# Patient Record
Sex: FEMALE | Race: BLACK OR AFRICAN AMERICAN | NOT HISPANIC OR LATINO | Employment: UNEMPLOYED | URBAN - METROPOLITAN AREA
[De-identification: names, ages, dates, MRNs, and addresses within clinical notes are randomized per-mention and may not be internally consistent; named-entity substitution may affect disease eponyms.]

---

## 2023-12-16 ENCOUNTER — HOSPITAL ENCOUNTER (EMERGENCY)
Facility: HOSPITAL | Age: 48
Discharge: HOME | End: 2023-12-16
Attending: EMERGENCY MEDICINE

## 2023-12-16 ENCOUNTER — APPOINTMENT (OUTPATIENT)
Dept: RADIOLOGY | Facility: HOSPITAL | Age: 48
End: 2023-12-16

## 2023-12-16 VITALS
DIASTOLIC BLOOD PRESSURE: 86 MMHG | HEIGHT: 65 IN | RESPIRATION RATE: 18 BRPM | WEIGHT: 200 LBS | HEART RATE: 64 BPM | OXYGEN SATURATION: 100 % | SYSTOLIC BLOOD PRESSURE: 148 MMHG | TEMPERATURE: 97.1 F | BODY MASS INDEX: 33.32 KG/M2

## 2023-12-16 DIAGNOSIS — K04.7 DENTAL ABSCESS: Primary | ICD-10-CM

## 2023-12-16 LAB
ALBUMIN SERPL BCP-MCNC: 4 G/DL (ref 3.4–5)
ALP SERPL-CCNC: 64 U/L (ref 33–110)
ALT SERPL W P-5'-P-CCNC: 13 U/L (ref 7–45)
ANION GAP SERPL CALC-SCNC: 11 MMOL/L (ref 10–20)
AST SERPL W P-5'-P-CCNC: 13 U/L (ref 9–39)
B-HCG SERPL-ACNC: <2 MIU/ML
BASOPHILS # BLD AUTO: 0.02 X10*3/UL (ref 0–0.1)
BASOPHILS NFR BLD AUTO: 0.3 %
BILIRUB SERPL-MCNC: 0.7 MG/DL (ref 0–1.2)
BUN SERPL-MCNC: 10 MG/DL (ref 6–23)
CALCIUM SERPL-MCNC: 9.2 MG/DL (ref 8.6–10.3)
CHLORIDE SERPL-SCNC: 106 MMOL/L (ref 98–107)
CO2 SERPL-SCNC: 26 MMOL/L (ref 21–32)
CREAT SERPL-MCNC: 0.62 MG/DL (ref 0.5–1.05)
EOSINOPHIL # BLD AUTO: 0.07 X10*3/UL (ref 0–0.7)
EOSINOPHIL NFR BLD AUTO: 1.2 %
ERYTHROCYTE [DISTWIDTH] IN BLOOD BY AUTOMATED COUNT: 15.3 % (ref 11.5–14.5)
GFR SERPL CREATININE-BSD FRML MDRD: >90 ML/MIN/1.73M*2
GLUCOSE SERPL-MCNC: 98 MG/DL (ref 74–99)
HCT VFR BLD AUTO: 41.2 % (ref 36–46)
HGB BLD-MCNC: 13 G/DL (ref 12–16)
IMM GRANULOCYTES # BLD AUTO: 0.06 X10*3/UL (ref 0–0.7)
IMM GRANULOCYTES NFR BLD AUTO: 1 % (ref 0–0.9)
LYMPHOCYTES # BLD AUTO: 1.5 X10*3/UL (ref 1.2–4.8)
LYMPHOCYTES NFR BLD AUTO: 25.1 %
MCH RBC QN AUTO: 26.5 PG (ref 26–34)
MCHC RBC AUTO-ENTMCNC: 31.6 G/DL (ref 32–36)
MCV RBC AUTO: 84 FL (ref 80–100)
MONOCYTES # BLD AUTO: 0.44 X10*3/UL (ref 0.1–1)
MONOCYTES NFR BLD AUTO: 7.4 %
NEUTROPHILS # BLD AUTO: 3.88 X10*3/UL (ref 1.2–7.7)
NEUTROPHILS NFR BLD AUTO: 65 %
NRBC BLD-RTO: 0 /100 WBCS (ref 0–0)
PLATELET # BLD AUTO: 319 X10*3/UL (ref 150–450)
POTASSIUM SERPL-SCNC: 4.1 MMOL/L (ref 3.5–5.3)
PROT SERPL-MCNC: 7.4 G/DL (ref 6.4–8.2)
RBC # BLD AUTO: 4.91 X10*6/UL (ref 4–5.2)
SODIUM SERPL-SCNC: 139 MMOL/L (ref 136–145)
WBC # BLD AUTO: 6 X10*3/UL (ref 4.4–11.3)

## 2023-12-16 PROCEDURE — 70486 CT MAXILLOFACIAL W/O DYE: CPT

## 2023-12-16 PROCEDURE — 99285 EMERGENCY DEPT VISIT HI MDM: CPT | Mod: 25 | Performed by: EMERGENCY MEDICINE

## 2023-12-16 PROCEDURE — 70491 CT SOFT TISSUE NECK W/DYE: CPT | Performed by: RADIOLOGY

## 2023-12-16 PROCEDURE — 2550000001 HC RX 255 CONTRASTS: Performed by: EMERGENCY MEDICINE

## 2023-12-16 PROCEDURE — 85025 COMPLETE CBC W/AUTO DIFF WBC: CPT | Performed by: PHYSICIAN ASSISTANT

## 2023-12-16 PROCEDURE — 2500000001 HC RX 250 WO HCPCS SELF ADMINISTERED DRUGS (ALT 637 FOR MEDICARE OP): Performed by: PHYSICIAN ASSISTANT

## 2023-12-16 PROCEDURE — 70491 CT SOFT TISSUE NECK W/DYE: CPT

## 2023-12-16 PROCEDURE — 36415 COLL VENOUS BLD VENIPUNCTURE: CPT | Performed by: PHYSICIAN ASSISTANT

## 2023-12-16 PROCEDURE — 84702 CHORIONIC GONADOTROPIN TEST: CPT | Performed by: PHYSICIAN ASSISTANT

## 2023-12-16 PROCEDURE — 80053 COMPREHEN METABOLIC PANEL: CPT | Performed by: PHYSICIAN ASSISTANT

## 2023-12-16 PROCEDURE — 96374 THER/PROPH/DIAG INJ IV PUSH: CPT

## 2023-12-16 PROCEDURE — 2500000004 HC RX 250 GENERAL PHARMACY W/ HCPCS (ALT 636 FOR OP/ED): Performed by: PHYSICIAN ASSISTANT

## 2023-12-16 RX ORDER — CLINDAMYCIN HYDROCHLORIDE 150 MG/1
450 CAPSULE ORAL ONCE
Status: COMPLETED | OUTPATIENT
Start: 2023-12-16 | End: 2023-12-16

## 2023-12-16 RX ORDER — KETOROLAC TROMETHAMINE 30 MG/ML
15 INJECTION, SOLUTION INTRAMUSCULAR; INTRAVENOUS ONCE
Status: COMPLETED | OUTPATIENT
Start: 2023-12-16 | End: 2023-12-16

## 2023-12-16 RX ORDER — ACETAMINOPHEN 325 MG/1
TABLET ORAL
Status: DISCONTINUED
Start: 2023-12-16 | End: 2023-12-16 | Stop reason: HOSPADM

## 2023-12-16 RX ORDER — CLINDAMYCIN HYDROCHLORIDE 150 MG/1
CAPSULE ORAL
Qty: 126 CAPSULE | Refills: 0 | Status: SHIPPED | OUTPATIENT
Start: 2023-12-16

## 2023-12-16 RX ADMIN — IOHEXOL 75 ML: 350 INJECTION, SOLUTION INTRAVENOUS at 12:47

## 2023-12-16 RX ADMIN — CLINDAMYCIN HYDROCHLORIDE 450 MG: 150 CAPSULE ORAL at 11:45

## 2023-12-16 RX ADMIN — KETOROLAC TROMETHAMINE 15 MG: 30 INJECTION, SOLUTION INTRAMUSCULAR at 11:46

## 2023-12-16 ASSESSMENT — COLUMBIA-SUICIDE SEVERITY RATING SCALE - C-SSRS
6. HAVE YOU EVER DONE ANYTHING, STARTED TO DO ANYTHING, OR PREPARED TO DO ANYTHING TO END YOUR LIFE?: NO
2. HAVE YOU ACTUALLY HAD ANY THOUGHTS OF KILLING YOURSELF?: NO
1. IN THE PAST MONTH, HAVE YOU WISHED YOU WERE DEAD OR WISHED YOU COULD GO TO SLEEP AND NOT WAKE UP?: NO

## 2023-12-16 ASSESSMENT — PAIN SCALES - GENERAL
PAINLEVEL_OUTOF10: 9
PAINLEVEL_OUTOF10: 8

## 2023-12-16 ASSESSMENT — PAIN DESCRIPTION - PAIN TYPE: TYPE: ACUTE PAIN

## 2023-12-16 ASSESSMENT — PAIN DESCRIPTION - LOCATION: LOCATION: EAR

## 2023-12-16 ASSESSMENT — PAIN - FUNCTIONAL ASSESSMENT: PAIN_FUNCTIONAL_ASSESSMENT: 0-10

## 2023-12-16 NOTE — Clinical Note
Sofia Doty was seen and treated in our emergency department on 12/16/2023.  She may return to work on 12/17/2023.       If you have any questions or concerns, please don't hesitate to call.      Pricila Muller PA-C

## 2023-12-16 NOTE — ED PROVIDER NOTES
"HPI     CC: Earache (right)     HPI: Sofia Doty is a 48 y.o. female with a history of hypertension and asthma presents to the emergency department with concern for right ear/neck pain.  Patient states that she also has some pain with swallowing.  She first felt the right ear pain a few weeks ago but took Tylenol severe sinus and the symptoms improved.  Symptoms returned yesterday, and she tried Tylenol sinus again without improvement.  When asked if she has had any recent illnesses, she states she did recently have an infected tooth and was placed on antibiotics.  She only finished the antibiotics on Monday and has yet to see a dentist due to lack of insurance.  She denies fever, chills, hearing issues, coughing, or rhinorrhea.    ROS: 10-point review of systems was performed and is otherwise negative except as noted in HPI.      Past Medical History: Noncontributory except per HPI     Past Surgical History: Tubal ligation     Family History: Reviewed and noncontributory     Social History: Denies      Allergies   Allergen Reactions    Amlodipine Besylate Dizziness and Headache    Penicillins Hives and Unknown       Home Meds:   Current Outpatient Medications   Medication Instructions    clindamycin (Cleocin) 150 mg capsule 3 tabs three times per day x14 days.        ED Triage Vitals [12/16/23 1109]   Temp Heart Rate Resp BP   36.2 °C (97.1 °F) 70 18 (!) 142/91      SpO2 Temp Source Heart Rate Source Patient Position   98 % Temporal Monitor Sitting      BP Location FiO2 (%)     Left arm --         Heart Rate:  [60-70]   Temp:  [36.2 °C (97.1 °F)]   Resp:  [18]   BP: (142-148)/(86-91)   Height:  [165.1 cm (5' 5\")]   Weight:  [90.7 kg (200 lb)]   SpO2:  [98 %-100 %]      Physical Exam:  Physical Exam  Constitutional:       General: She is not in acute distress.     Appearance: Normal appearance. She is not ill-appearing.   HENT:      Head: Normocephalic and atraumatic.      Right Ear: Hearing, tympanic membrane " and external ear normal.      Left Ear: Hearing, tympanic membrane and external ear normal.      Nose: Nose normal.      Mouth/Throat:      Mouth: Mucous membranes are moist.        Comments: Multiple missing teeth and poor dentition.  Approximately tooth above is missing/decaying down to the gumline.  Unable to fully characterize which tooth this is based on poor dentition.  Area is not tender to palpation, however there is a lesion on the outer aspect of the gum at this location with central area concerning for abscess.  Facial tenderness extends along the maxillary bone up into the TMJ without mastoid tenderness.  Tenderness is also in the mandibular aspect and down the right anterior neck with some mild swelling.  No oropharynx redness, exudates, or uvular deviation.  Eyes:      Extraocular Movements: Extraocular movements intact.      Conjunctiva/sclera: Conjunctivae normal.      Pupils: Pupils are equal, round, and reactive to light.   Cardiovascular:      Rate and Rhythm: Normal rate and regular rhythm.      Pulses: Normal pulses.   Pulmonary:      Effort: Pulmonary effort is normal. No respiratory distress.      Breath sounds: Normal breath sounds.   Abdominal:      General: Abdomen is flat.      Palpations: Abdomen is soft.      Tenderness: There is no abdominal tenderness.   Musculoskeletal:         General: Normal range of motion.      Cervical back: Normal range of motion and neck supple.   Skin:     General: Skin is warm and dry.      Capillary Refill: Capillary refill takes less than 2 seconds.   Neurological:      General: No focal deficit present.      Mental Status: She is alert and oriented to person, place, and time.   Psychiatric:         Mood and Affect: Mood normal.          Diagnostic Results        Labs Reviewed   CBC WITH AUTO DIFFERENTIAL - Abnormal       Result Value    WBC 6.0      nRBC 0.0      RBC 4.91      Hemoglobin 13.0      Hematocrit 41.2      MCV 84      MCH 26.5      MCHC 31.6  (*)     RDW 15.3 (*)     Platelets 319      Neutrophils % 65.0      Immature Granulocytes %, Automated 1.0 (*)     Lymphocytes % 25.1      Monocytes % 7.4      Eosinophils % 1.2      Basophils % 0.3      Neutrophils Absolute 3.88      Immature Granulocytes Absolute, Automated 0.06      Lymphocytes Absolute 1.50      Monocytes Absolute 0.44      Eosinophils Absolute 0.07      Basophils Absolute 0.02     HUMAN CHORIONIC GONADOTROPIN, SERUM QUANTITATIVE - Normal    HCG, Beta-Quantitative <2      Narrative:      Total HCG measurement is performed using the [employer]   Immunoassay which detects intact HCG and free beta HCG subunit.    This test is not indicated for use as a tumor marker.   HCG testing is performed using a different test methodology at [facility] than other [facility]. Direct result comparison   should only be made within the same method.       COMPREHENSIVE METABOLIC PANEL - Normal    Glucose 98      Sodium 139      Potassium 4.1      Chloride 106      Bicarbonate 26      Anion Gap 11      Urea Nitrogen 10      Creatinine 0.62      eGFR >90      Calcium 9.2      Albumin 4.0      Alkaline Phosphatase 64      Total Protein 7.4      AST 13      Bilirubin, Total 0.7      ALT 13           CT soft tissue neck w IV contrast   Final Result   No rim enhancing drainable collection was identified in the region of   the right anterior neck. If there is a region of concern on physical   examination etiology of finding could be clarified with directed   sonography.        Minimal asymmetry in soft tissue within the visualized portion of the   right compared with left upper breast for instance on image 135   series 603 is of uncertain clinical significance and could be at   least in part due to asymmetric scan positioning. Please correlate   clinically and if further evaluation were clinically necessary   consider correlation with mammography for instance on a nonemergent   basis.         Critical Finding:  See findings. Notification was initiated on   12/16/2023 at 1:48 pm by  Ravinder Nava.  (**-YCF-**) Instructions:        Signed by: Ravinder Nava 12/16/2023 1:48 PM   Dictation workstation:   [id number]      CT maxillofacial bones wo IV contrast   Final Result   1. Severe destruction/dental nino formation in the right 1st   maxillary molar with apical/periapical lucency which may represent   apical abscess formation and/or apical cystic change.   2. If there remains persistent concern for process in the neck a CT   of the neck with intravenous contrast is recommended.        MACRO:   none        Signed by: Ian Escobar 12/16/2023 2:15 PM   Dictation workstation:   [id number]                    [place] Coma Scale Score: 15                  Procedure  Procedures    ED Course & MDM   Assessment/Plan:     Medications   acetaminophen (Tylenol) tablet  - Omnicell Override Pull (  Not Given 12/16/23 1255)   ketorolac (Toradol) injection 15 mg (15 mg intravenous Given 12/16/23 1146)   clindamycin (Cleocin) capsule 450 mg (450 mg oral Given 12/16/23 1145)   iohexol (OMNIPaque) 350 mg iodine/mL solution 75 mL (75 mL intravenous Given 12/16/23 1247)        ED Course as of 12/16/23 1528   Sat Dec 16, 2023   1148 Immature Granulocytes %, Automated(!): 1.0  elevated [EP]      ED Course User Index  [EP] Pricila Muller PA-C         Diagnoses as of 12/16/23 1528   Dental abscess       Medical Decision Making    Sofia Doty is a 48 y.o. female with a history of asthma and hypertension presents to the emergency department with concern for right facial pain and trouble swallowing.  The patient is nontoxic-appearing and her vital signs are normal.  Based on her presentation, physical exam, and history, differential diagnosis includes dental abscess, retropharyngeal abscess, osteomyelitis, or other deep infection.  Will obtain CBC with differential, CMP, CT face with contrast, and CT neck with contrast.  Will  give the patient 15 mg of Toradol and 450 mg of clindamycin orally.  hCG negative.  CMP normal.  CBC unremarkable other than 1.0 immature granulocytes. CT of the facial bones show severe destruction/dental carry formation in the right first maxillary molar with periapical lucency which may represent apical abscess formation.  CT of the neck showed no rim-enhancing drainable fluid collection but if there is concern, recommend ultrasound.  There is an incidental finding of minimal asymmetry in the soft tissue within the visualized portion of the right compared to the left upper breast which is of uncertain clinical significance and could be due to underlying skin positioning.  Based on these findings, a breast exam was performed which showed no acute abnormalities.  Will still recommend that the patient follows up with a formal breast ultrasound ordered by her primary care physician.  A bedside point-of-care ultrasound was performed by the attending.  Will plan to treat the patient with antibiotics and follow-up to dentistry.    Dental pain - I discussed the diagnosis of dental pain with the patient and all of the above findings.  We discussed that there is still a localized abscess in the area of the tooth that is already been treated.  These will require antibiotics as well as follow-up with dentistry as soon as possible.  Will place the patient on clindamycin as she was previously on cefoxitin for antibiotics.  The patient does not currently have any pain, but if some should develop, she may take Motrin or Tylenol for pain control.  I have ordered her follow-up with dentistry so that she may be seen a soon as possible.  Advised to return to the ED should she experience new or worsening symptoms including but not limited to fever, chills, worsening pain, drainage from tooth, or any new symptoms. Patient was agreeable to discharge home.  Incidental findings -primary care physician follow-up was ordered for the  patient so that her incidental finding of possible asymmetric breast changes can be followed up upon.  Would recommend either outpatient ultrasound or mammogram to follow-up.  Patient agreeable to plan of care and felt comfortable returning home.    Disposition: Home    ED Prescriptions       Medication Sig Dispense Start Date End Date Auth. Provider    clindamycin (Cleocin) 150 mg capsule 3 tabs three times per day x14 days. 126 capsule 12/16/2023 -- Pricila Muller PA-C            Social Determinants Affecting Care: lack of insurance      Pricila Muller PA-C    This note was dictated by speech recognition. Minor errors in transcription may be present.     Pricila Muller PA-C  12/16/23 [id number]

## 2024-02-22 ENCOUNTER — HOSPITAL ENCOUNTER (EMERGENCY)
Facility: HOSPITAL | Age: 49
Discharge: HOME | End: 2024-02-22
Payer: COMMERCIAL

## 2024-02-22 VITALS
HEIGHT: 65 IN | TEMPERATURE: 98.1 F | SYSTOLIC BLOOD PRESSURE: 149 MMHG | OXYGEN SATURATION: 99 % | DIASTOLIC BLOOD PRESSURE: 73 MMHG | RESPIRATION RATE: 20 BRPM | BODY MASS INDEX: 33.32 KG/M2 | WEIGHT: 200 LBS | HEART RATE: 70 BPM

## 2024-02-22 DIAGNOSIS — K08.89 PAIN, DENTAL: Primary | ICD-10-CM

## 2024-02-22 PROCEDURE — 2500000001 HC RX 250 WO HCPCS SELF ADMINISTERED DRUGS (ALT 637 FOR MEDICARE OP): Performed by: PHYSICIAN ASSISTANT

## 2024-02-22 PROCEDURE — 99283 EMERGENCY DEPT VISIT LOW MDM: CPT

## 2024-02-22 RX ORDER — CLINDAMYCIN HYDROCHLORIDE 150 MG/1
300 CAPSULE ORAL ONCE
Status: COMPLETED | OUTPATIENT
Start: 2024-02-22 | End: 2024-02-22

## 2024-02-22 RX ORDER — IBUPROFEN 600 MG/1
600 TABLET ORAL ONCE
Status: COMPLETED | OUTPATIENT
Start: 2024-02-22 | End: 2024-02-22

## 2024-02-22 RX ORDER — CLINDAMYCIN HYDROCHLORIDE 150 MG/1
150 CAPSULE ORAL EVERY 6 HOURS
Qty: 28 CAPSULE | Refills: 0 | Status: SHIPPED | OUTPATIENT
Start: 2024-02-22 | End: 2024-02-29

## 2024-02-22 RX ADMIN — IBUPROFEN 600 MG: 600 TABLET, FILM COATED ORAL at 02:00

## 2024-02-22 RX ADMIN — CLINDAMYCIN HYDROCHLORIDE 300 MG: 150 CAPSULE ORAL at 02:00

## 2024-02-22 ASSESSMENT — PAIN - FUNCTIONAL ASSESSMENT: PAIN_FUNCTIONAL_ASSESSMENT: 0-10

## 2024-02-22 ASSESSMENT — COLUMBIA-SUICIDE SEVERITY RATING SCALE - C-SSRS
6. HAVE YOU EVER DONE ANYTHING, STARTED TO DO ANYTHING, OR PREPARED TO DO ANYTHING TO END YOUR LIFE?: NO
1. IN THE PAST MONTH, HAVE YOU WISHED YOU WERE DEAD OR WISHED YOU COULD GO TO SLEEP AND NOT WAKE UP?: NO
2. HAVE YOU ACTUALLY HAD ANY THOUGHTS OF KILLING YOURSELF?: NO

## 2024-02-22 ASSESSMENT — PAIN SCALES - GENERAL: PAINLEVEL_OUTOF10: 5 - MODERATE PAIN

## 2024-02-22 ASSESSMENT — PAIN DESCRIPTION - LOCATION: LOCATION: JAW

## 2024-02-22 NOTE — ED PROVIDER NOTES
HPI   Chief Complaint   Patient presents with    Dental Pain     Per patient complaining of left dental pain left jaw hurts        48-year-old female complains of dental pain and swelling in the side of her jaw she was seen about 6 weeks ago and placed on antibiotics and symptoms resolved but they then returned.  She denies chest pain or shortness of breath.  States she has a dentist she can follow-up with.                          [place] Coma Scale Score: 15                     Patient History   Past Medical History:   Diagnosis Date    Hypertension      No past surgical history on file.  No family history on file.  Social History     Tobacco Use    Smoking status: Never    Smokeless tobacco: Never   Substance Use Topics    Alcohol use: Not on file    Drug use: Not on file       Physical Exam   ED Triage Vitals [02/22/24 0113]   Temperature Heart Rate Respirations BP   36.7 °C (98.1 °F) 70 20 149/73      Pulse Ox Temp Source Heart Rate Source Patient Position   99 % Tympanic -- --      BP Location FiO2 (%)     -- --       Physical Exam  Vitals and nursing note reviewed.   Constitutional:       General: She is not in acute distress.     Appearance: She is well-developed.   HENT:      Head: Normocephalic and atraumatic.      Right Ear: Tympanic membrane, ear canal and external ear normal.      Left Ear: Tympanic membrane, ear canal and external ear normal.      Nose: Nose normal.      Mouth/Throat:      Mouth: Mucous membranes are moist.      Dentition: Dental caries present.      Pharynx: No oropharyngeal exudate or posterior oropharyngeal erythema.   Eyes:      Conjunctiva/sclera: Conjunctivae normal.   Cardiovascular:      Rate and Rhythm: Normal rate and regular rhythm.      Heart sounds: No murmur heard.  Pulmonary:      Effort: Pulmonary effort is normal. No respiratory distress.      Breath sounds: Normal breath sounds.   Abdominal:      Palpations: Abdomen is soft.      Tenderness: There is no abdominal  tenderness.   Musculoskeletal:         General: No swelling.      Cervical back: Neck supple.   Skin:     General: Skin is warm and dry.      Capillary Refill: Capillary refill takes less than 2 seconds.   Neurological:      Mental Status: She is alert.   Psychiatric:         Mood and Affect: Mood normal.         ED Course & MDM   Diagnoses as of 02/22/24 0410   Pain, dental       Medical Decision Making  H&P supports dental infection her ear appears normal on exam she denies chest pain or shortness of breath.  Since clindamycin helped in the past I would recommend she take another course and follow-up with her dentist, ibuprofen for pain, return precautions given.        Procedure  Procedures     Shravan Rico PA-C  02/22/24 [id number]

## 2024-10-05 ENCOUNTER — HOSPITAL ENCOUNTER (EMERGENCY)
Facility: HOSPITAL | Age: 49
Discharge: HOME | End: 2024-10-05
Payer: MEDICARE

## 2024-10-05 VITALS
RESPIRATION RATE: 16 BRPM | WEIGHT: 201 LBS | OXYGEN SATURATION: 95 % | BODY MASS INDEX: 34.31 KG/M2 | HEIGHT: 64 IN | DIASTOLIC BLOOD PRESSURE: 91 MMHG | HEART RATE: 80 BPM | SYSTOLIC BLOOD PRESSURE: 157 MMHG | TEMPERATURE: 98.1 F

## 2024-10-05 DIAGNOSIS — H65.02 ACUTE SEROUS OTITIS MEDIA OF LEFT EAR, RECURRENCE NOT SPECIFIED: Primary | ICD-10-CM

## 2024-10-05 PROCEDURE — 99281 EMR DPT VST MAYX REQ PHY/QHP: CPT

## 2024-10-05 RX ORDER — FLUTICASONE PROPIONATE 50 MCG
1 SPRAY, SUSPENSION (ML) NASAL DAILY
Qty: 16 G | Refills: 0 | Status: SHIPPED | OUTPATIENT
Start: 2024-10-05 | End: 2024-11-04

## 2024-10-05 ASSESSMENT — PAIN DESCRIPTION - ORIENTATION: ORIENTATION: LEFT

## 2024-10-05 ASSESSMENT — PAIN DESCRIPTION - DESCRIPTORS: DESCRIPTORS: ACHING

## 2024-10-05 ASSESSMENT — PAIN SCALES - GENERAL: PAINLEVEL_OUTOF10: 7

## 2024-10-05 ASSESSMENT — PAIN DESCRIPTION - PAIN TYPE: TYPE: ACUTE PAIN

## 2024-10-05 ASSESSMENT — PAIN DESCRIPTION - FREQUENCY: FREQUENCY: CONSTANT/CONTINUOUS

## 2024-10-05 ASSESSMENT — PAIN - FUNCTIONAL ASSESSMENT: PAIN_FUNCTIONAL_ASSESSMENT: 0-10

## 2024-10-05 ASSESSMENT — PAIN DESCRIPTION - LOCATION: LOCATION: EAR

## 2024-10-05 NOTE — ED TRIAGE NOTES
Pt states on Monday she was having right ear/face pain and started on antibiotics. Pt states yesterday the left ear and the left side of the throat was aching. Pt soreness in the throat when swallowing.

## 2024-10-05 NOTE — ED PROVIDER NOTES
HPI   Chief Complaint   Patient presents with    Earache       Is a 49-year-old female who complains of pain and discomfort in her left ear she has been on cefdinir for the past 5 out of 7 days.  She was seen at urgent care initially for right ear she states his sides improving but now has discomfort inside her left ear with some radiation going downward.  No fever no chills nothing makes her better or worse otherwise.  Admits to recent URI.            Patient History   Past Medical History:   Diagnosis Date    Hypertension      No past surgical history on file.  No family history on file.  Social History     Tobacco Use    Smoking status: Never    Smokeless tobacco: Never   Substance Use Topics    Alcohol use: Not on file    Drug use: Not on file       Physical Exam   ED Triage Vitals [10/05/24 1545]   Temperature Heart Rate Respirations BP   36.7 °C (98.1 °F) 80 16 (!) 157/91      Pulse Ox Temp Source Heart Rate Source Patient Position   95 % Temporal -- --      BP Location FiO2 (%)     -- --       Physical Exam  Vitals and nursing note reviewed.   Constitutional:       General: She is not in acute distress.     Appearance: Normal appearance. She is normal weight. She is not ill-appearing, toxic-appearing or diaphoretic.   HENT:      Head: Normocephalic and atraumatic.      Right Ear: External ear normal.      Left Ear: External ear normal.      Ears:      Comments: L TM, with fluid. No erythema.     Nose: Nose normal.      Mouth/Throat:      Mouth: Mucous membranes are moist.   Eyes:      Extraocular Movements: Extraocular movements intact.      Conjunctiva/sclera: Conjunctivae normal.      Pupils: Pupils are equal, round, and reactive to light.   Pulmonary:      Effort: Pulmonary effort is normal. No respiratory distress.      Breath sounds: No stridor.   Abdominal:      General: There is no distension.   Musculoskeletal:         General: No swelling, tenderness, deformity or signs of injury.      Cervical back:  Normal range of motion.   Skin:     Capillary Refill: Capillary refill takes less than 2 seconds.      Findings: No rash.   Neurological:      General: No focal deficit present.      Mental Status: She is alert and oriented to person, place, and time. Mental status is at baseline.   Psychiatric:         Mood and Affect: Mood normal.         Behavior: Behavior normal.         Thought Content: Thought content normal.         Judgment: Judgment normal.         ED Course & MDM   Diagnoses as of 10/05/24 1633   Acute serous otitis media of left ear, recurrence not specified                 No data recorded     [place] Coma Scale Score: 15 (10/05/24 1545 : Tim Jacobo, ALMITA)                           Medical Decision Making  Ddx: OM, OE, effusion    Discussed continue antibiotics, add flonase, see ent prn.     Doesn't warrant imaging or EKG or labs.        Procedure  Procedures     Shravan Rico PA-C  10/05/24 2766

## 2024-10-10 ENCOUNTER — HOSPITAL ENCOUNTER (EMERGENCY)
Facility: HOSPITAL | Age: 49
Discharge: HOME | End: 2024-10-10
Payer: MEDICARE

## 2024-10-10 VITALS
OXYGEN SATURATION: 98 % | HEIGHT: 64 IN | TEMPERATURE: 98.2 F | BODY MASS INDEX: 34.15 KG/M2 | SYSTOLIC BLOOD PRESSURE: 141 MMHG | RESPIRATION RATE: 16 BRPM | DIASTOLIC BLOOD PRESSURE: 88 MMHG | WEIGHT: 200 LBS | HEART RATE: 79 BPM

## 2024-10-10 DIAGNOSIS — K02.9 PAIN DUE TO DENTAL CARIES: Primary | ICD-10-CM

## 2024-10-10 PROCEDURE — 96372 THER/PROPH/DIAG INJ SC/IM: CPT | Performed by: NURSE PRACTITIONER

## 2024-10-10 PROCEDURE — 2500000004 HC RX 250 GENERAL PHARMACY W/ HCPCS (ALT 636 FOR OP/ED): Performed by: NURSE PRACTITIONER

## 2024-10-10 PROCEDURE — 2500000001 HC RX 250 WO HCPCS SELF ADMINISTERED DRUGS (ALT 637 FOR MEDICARE OP): Performed by: NURSE PRACTITIONER

## 2024-10-10 PROCEDURE — 99283 EMERGENCY DEPT VISIT LOW MDM: CPT

## 2024-10-10 RX ORDER — KETOROLAC TROMETHAMINE 30 MG/ML
15 INJECTION, SOLUTION INTRAMUSCULAR; INTRAVENOUS ONCE
Status: COMPLETED | OUTPATIENT
Start: 2024-10-10 | End: 2024-10-10

## 2024-10-10 RX ORDER — NAPROXEN 500 MG/1
500 TABLET ORAL
Qty: 14 TABLET | Refills: 0 | Status: SHIPPED | OUTPATIENT
Start: 2024-10-10 | End: 2024-10-17

## 2024-10-10 RX ORDER — AMOXICILLIN AND CLAVULANATE POTASSIUM 875; 125 MG/1; MG/1
1 TABLET, FILM COATED ORAL ONCE
Status: COMPLETED | OUTPATIENT
Start: 2024-10-10 | End: 2024-10-10

## 2024-10-10 RX ORDER — AMOXICILLIN AND CLAVULANATE POTASSIUM 875; 125 MG/1; MG/1
1 TABLET, FILM COATED ORAL EVERY 12 HOURS
Qty: 14 TABLET | Refills: 0 | Status: SHIPPED | OUTPATIENT
Start: 2024-10-10 | End: 2024-10-17

## 2024-10-10 ASSESSMENT — COLUMBIA-SUICIDE SEVERITY RATING SCALE - C-SSRS
2. HAVE YOU ACTUALLY HAD ANY THOUGHTS OF KILLING YOURSELF?: NO
6. HAVE YOU EVER DONE ANYTHING, STARTED TO DO ANYTHING, OR PREPARED TO DO ANYTHING TO END YOUR LIFE?: NO
1. IN THE PAST MONTH, HAVE YOU WISHED YOU WERE DEAD OR WISHED YOU COULD GO TO SLEEP AND NOT WAKE UP?: NO

## 2024-10-10 ASSESSMENT — PAIN DESCRIPTION - DESCRIPTORS: DESCRIPTORS: ACHING;SORE;TENDER

## 2024-10-10 ASSESSMENT — PAIN DESCRIPTION - PROGRESSION: CLINICAL_PROGRESSION: GRADUALLY WORSENING

## 2024-10-10 ASSESSMENT — PAIN DESCRIPTION - FREQUENCY: FREQUENCY: CONSTANT/CONTINUOUS

## 2024-10-10 ASSESSMENT — PAIN DESCRIPTION - LOCATION: LOCATION: HEAD

## 2024-10-10 ASSESSMENT — PAIN - FUNCTIONAL ASSESSMENT: PAIN_FUNCTIONAL_ASSESSMENT: 0-10

## 2024-10-10 ASSESSMENT — PAIN DESCRIPTION - PAIN TYPE: TYPE: ACUTE PAIN

## 2024-10-10 ASSESSMENT — PAIN DESCRIPTION - ONSET: ONSET: ONGOING

## 2024-10-10 ASSESSMENT — PAIN DESCRIPTION - ORIENTATION: ORIENTATION: RIGHT

## 2024-10-10 ASSESSMENT — PAIN SCALES - GENERAL: PAINLEVEL_OUTOF10: 8

## 2024-10-10 NOTE — ED TRIAGE NOTES
Patient presents to the ED complaining of Headache & Neck pain. Her pain has been ongoing since 16:30. She was recently seen in the ED for Left Otitis Media on 10/05 and a Right Otitis Media w/ Right-Upper Dental Caries/Decay on 9/30.    The Patient is also complaining of non-traumatic Right Knee pain that has been intermittently ongoing for approximately x1 year.

## 2024-10-10 NOTE — DISCHARGE INSTRUCTIONS
Follow up with [facility] [phone] within two days for further evaluation and management of care    Prescription sent to pharmacy. Take all medication as prescribed    Follow up instructions discussed. ED precautions discussed and advised to return to ED if symptoms worsen or persist for follow up.

## 2024-10-14 NOTE — ED PROVIDER NOTES
HPI     CC: Dental Problem, Headache, Neck Pain, and Knee Pain     HPI: Sofia Doty is a 49 y.o. female with past medical history of hypertension presents with right jaw pain. She endorses associated neck pain and headache. Patient is concerned for a possible dental infection as she had a root canal done on one of her right upper teeth but never had a cap placed on it. Pain started yesterday. No relief with over-the-counter medications. Denies associated symptoms including fever, chills, cough, congestion, rhinorrhea, difficulty tolerating secretions, feelings of throat closure, or shortness of breath.    ROS: 10-point review of systems was performed and is otherwise negative except as noted in HPI.      Past Medical History: Noncontributory except per HPI     Past Surgical History: Noncontributory except per HPI     Family History: Reviewed and noncontributory     Social History:  Noncontributory except per HPI       Allergies   Allergen Reactions    Amlodipine Besylate Dizziness and Headache    Penicillins Hives and Unknown       Past Medical History:   Diagnosis Date    Hypertension        Home Meds:   Current Outpatient Medications   Medication Instructions    amoxicillin-pot clavulanate (Augmentin) 875-125 mg tablet 1 tablet, oral, Every 12 hours    clindamycin (Cleocin) 150 mg capsule 3 tabs three times per day x14 days.    fluticasone (Flonase) 50 mcg/actuation nasal spray 1 spray, Each Nostril, Daily, Shake gently. Before first use, prime pump. After use, clean tip and replace cap.    naproxen (NAPROSYN) 500 mg, oral, 2 times daily (morning and late afternoon)        ED Triage Vitals [10/10/24 0031]   Temperature Heart Rate Respirations BP   36.8 °C (98.2 °F) 82 16 149/90      Pulse Ox Temp Source Heart Rate Source Patient Position   99 % Temporal Monitor Sitting      BP Location FiO2 (%)     Left arm --               Physical Exam:  Physical Exam  Vitals and nursing note reviewed.   Constitutional:        General: She is not in acute distress.     Appearance: She is well-developed.   HENT:      Head: Normocephalic and atraumatic.      Mouth/Throat:      Lips: Pink.      Mouth: Mucous membranes are moist.      Dentition: Abnormal dentition. Dental tenderness, gingival swelling and dental caries present.      Tongue: No lesions. Tongue does not deviate from midline.   Eyes:      Conjunctiva/sclera: Conjunctivae normal.   Cardiovascular:      Rate and Rhythm: Normal rate and regular rhythm.      Heart sounds: No murmur heard.  Pulmonary:      Effort: Pulmonary effort is normal. No respiratory distress.      Breath sounds: Normal breath sounds.   Abdominal:      Palpations: Abdomen is soft.      Tenderness: There is no abdominal tenderness.   Musculoskeletal:         General: No swelling.      Cervical back: Neck supple.   Skin:     General: Skin is warm and dry.      Capillary Refill: Capillary refill takes less than 2 seconds.   Neurological:      Mental Status: She is alert.   Psychiatric:         Mood and Affect: Mood normal.          Diagnostic Results        Labs Reviewed - No data to display      No orders to display                 No data recorded                Procedure  Procedures    ED Course & MDM   Assessment/Plan:     Medications   ketorolac (Toradol) injection 15 mg (15 mg intramuscular Given 10/10/24 0234)   amoxicillin-pot clavulanate (Augmentin) 875-125 mg per tablet 1 tablet (1 tablet oral Given 10/10/24 0234)        Diagnoses as of 10/14/24 1433   Pain due to dental caries       Medical Decision Making    Sofia Doty is a 49 y.o. female with past medical history of hypertension presents with complaint of right jaw/neck pain and headache. She reports having a root canal done on one of her right upper teeth but never had a cap placed on it.  Differential diagnosis Ludwigs vs Tonsillar abscess/retropharyngeal abscess vs Dental Infection/Dental Carries. On exam she is A&OX3, NAD Noted, VSS. There  is mild erythema, pain and swelling to right side of gums. I suspect the source of pain could be coming from exposed nerves in root canal and other dental carried. Patient given IM injection of Toradol 15 mg with some relief.    I discussed this patients care with Dr. Kasper who also evaluated the patient.    Patient diagnosed with dental carries/infection. Upon chart review patient has an allergy of hives to penicillins although patient denies allergy to Augmentin and reports she has tolerated in the past. Patient was placed on Augmentin 875-125 mg PO BID and given first dose in ED without issues.    Patient is advised to follow up with [facility] [phone] within two days for further evaluation and management of care    Prescription sent to pharmacy. Take all medication as prescribed    Follow up instructions discussed. ED precautions discussed and advised to return to ED if symptoms worsen or persist for follow up. She expressed understanding was agreeable with plan and discharge at this time. Discharged in hemodynamically stable condition.      Disposition: Home    ED Prescriptions       Medication Sig Dispense Start Date End Date Auth. Provider    amoxicillin-pot clavulanate (Augmentin) 875-125 mg tablet Take 1 tablet by mouth every 12 hours for 7 days. 14 tablet 10/10/2024 10/17/2024 CHRISTINE Bowles    naproxen (Naprosyn) 500 mg tablet Take 1 tablet (500 mg) by mouth 2 times daily (morning and late afternoon) for 7 days. 14 tablet 10/10/2024 10/17/2024 CHRISTINE Bowles            Social Determinants Affecting Care: none     CHRISTINE Henry    This note was dictated by speech recognition. Minor errors in transcription may be present.     CHRISTINE Bowles  10/14/24 2023